# Patient Record
Sex: MALE | Race: WHITE | NOT HISPANIC OR LATINO | Employment: OTHER | URBAN - METROPOLITAN AREA
[De-identification: names, ages, dates, MRNs, and addresses within clinical notes are randomized per-mention and may not be internally consistent; named-entity substitution may affect disease eponyms.]

---

## 2018-07-06 ENCOUNTER — ANESTHESIA (OUTPATIENT)
Dept: PERIOP | Facility: HOSPITAL | Age: 63
End: 2018-07-06

## 2018-07-06 ENCOUNTER — HOSPITAL ENCOUNTER (OUTPATIENT)
Facility: HOSPITAL | Age: 63
Setting detail: HOSPITAL OUTPATIENT SURGERY
Discharge: HOME | End: 2018-07-06
Attending: UROLOGY | Admitting: UROLOGY

## 2018-07-06 ENCOUNTER — ANESTHESIA EVENT (OUTPATIENT)
Dept: PERIOP | Facility: HOSPITAL | Age: 63
End: 2018-07-06

## 2018-07-06 VITALS
RESPIRATION RATE: 16 BRPM | HEIGHT: 69 IN | SYSTOLIC BLOOD PRESSURE: 137 MMHG | DIASTOLIC BLOOD PRESSURE: 85 MMHG | TEMPERATURE: 98.6 F | HEART RATE: 55 BPM | OXYGEN SATURATION: 93 % | WEIGHT: 187.17 LBS | BODY MASS INDEX: 27.72 KG/M2

## 2018-07-06 DIAGNOSIS — N20.1 URETERAL CALCULUS, LEFT: Primary | ICD-10-CM

## 2018-07-06 PROCEDURE — 25010000002 DEXAMETHASONE PER 1 MG: Performed by: NURSE ANESTHETIST, CERTIFIED REGISTERED

## 2018-07-06 PROCEDURE — 25010000002 ONDANSETRON PER 1 MG: Performed by: NURSE ANESTHETIST, CERTIFIED REGISTERED

## 2018-07-06 PROCEDURE — 25010000002 FENTANYL CITRATE (PF) 100 MCG/2ML SOLUTION: Performed by: NURSE ANESTHETIST, CERTIFIED REGISTERED

## 2018-07-06 PROCEDURE — 25010000002 KETOROLAC TROMETHAMINE PER 15 MG: Performed by: NURSE ANESTHETIST, CERTIFIED REGISTERED

## 2018-07-06 PROCEDURE — C1758 CATHETER, URETERAL: HCPCS | Performed by: UROLOGY

## 2018-07-06 PROCEDURE — C1769 GUIDE WIRE: HCPCS | Performed by: UROLOGY

## 2018-07-06 PROCEDURE — 25010000002 PROPOFOL 10 MG/ML EMULSION: Performed by: NURSE ANESTHETIST, CERTIFIED REGISTERED

## 2018-07-06 PROCEDURE — 25010000002 MIDAZOLAM PER 1 MG: Performed by: ANESTHESIOLOGY

## 2018-07-06 RX ORDER — PROMETHAZINE HYDROCHLORIDE 25 MG/1
25 SUPPOSITORY RECTAL ONCE AS NEEDED
Status: DISCONTINUED | OUTPATIENT
Start: 2018-07-06 | End: 2018-07-06 | Stop reason: HOSPADM

## 2018-07-06 RX ORDER — PROMETHAZINE HYDROCHLORIDE 25 MG/ML
6.25 INJECTION, SOLUTION INTRAMUSCULAR; INTRAVENOUS ONCE AS NEEDED
Status: DISCONTINUED | OUTPATIENT
Start: 2018-07-06 | End: 2018-07-06 | Stop reason: HOSPADM

## 2018-07-06 RX ORDER — FENTANYL CITRATE 50 UG/ML
INJECTION, SOLUTION INTRAMUSCULAR; INTRAVENOUS AS NEEDED
Status: DISCONTINUED | OUTPATIENT
Start: 2018-07-06 | End: 2018-07-06 | Stop reason: SURG

## 2018-07-06 RX ORDER — FLUMAZENIL 0.1 MG/ML
0.2 INJECTION INTRAVENOUS AS NEEDED
Status: DISCONTINUED | OUTPATIENT
Start: 2018-07-06 | End: 2018-07-06 | Stop reason: HOSPADM

## 2018-07-06 RX ORDER — CEFAZOLIN SODIUM 2 G/100ML
2 INJECTION, SOLUTION INTRAVENOUS ONCE
Status: DISCONTINUED | OUTPATIENT
Start: 2018-07-06 | End: 2018-07-06 | Stop reason: HOSPADM

## 2018-07-06 RX ORDER — DIPHENHYDRAMINE HYDROCHLORIDE 50 MG/ML
6.25 INJECTION INTRAMUSCULAR; INTRAVENOUS
Status: DISCONTINUED | OUTPATIENT
Start: 2018-07-06 | End: 2018-07-06 | Stop reason: HOSPADM

## 2018-07-06 RX ORDER — PROPOFOL 10 MG/ML
VIAL (ML) INTRAVENOUS AS NEEDED
Status: DISCONTINUED | OUTPATIENT
Start: 2018-07-06 | End: 2018-07-06 | Stop reason: SURG

## 2018-07-06 RX ORDER — PROMETHAZINE HYDROCHLORIDE 25 MG/1
25 TABLET ORAL ONCE AS NEEDED
Status: DISCONTINUED | OUTPATIENT
Start: 2018-07-06 | End: 2018-07-06 | Stop reason: HOSPADM

## 2018-07-06 RX ORDER — SODIUM CHLORIDE 0.9 % (FLUSH) 0.9 %
1-10 SYRINGE (ML) INJECTION AS NEEDED
Status: DISCONTINUED | OUTPATIENT
Start: 2018-07-06 | End: 2018-07-06 | Stop reason: HOSPADM

## 2018-07-06 RX ORDER — DEXAMETHASONE SODIUM PHOSPHATE 10 MG/ML
INJECTION INTRAMUSCULAR; INTRAVENOUS AS NEEDED
Status: DISCONTINUED | OUTPATIENT
Start: 2018-07-06 | End: 2018-07-06 | Stop reason: SURG

## 2018-07-06 RX ORDER — OXYCODONE HYDROCHLORIDE AND ACETAMINOPHEN 5; 325 MG/1; MG/1
1 TABLET ORAL EVERY 6 HOURS PRN
COMMUNITY

## 2018-07-06 RX ORDER — PROMETHAZINE HYDROCHLORIDE 25 MG/1
12.5 TABLET ORAL ONCE AS NEEDED
Status: DISCONTINUED | OUTPATIENT
Start: 2018-07-06 | End: 2018-07-06 | Stop reason: HOSPADM

## 2018-07-06 RX ORDER — SODIUM CHLORIDE, SODIUM LACTATE, POTASSIUM CHLORIDE, CALCIUM CHLORIDE 600; 310; 30; 20 MG/100ML; MG/100ML; MG/100ML; MG/100ML
9 INJECTION, SOLUTION INTRAVENOUS CONTINUOUS
Status: DISCONTINUED | OUTPATIENT
Start: 2018-07-06 | End: 2018-07-06 | Stop reason: HOSPADM

## 2018-07-06 RX ORDER — EPHEDRINE SULFATE 50 MG/ML
5 INJECTION, SOLUTION INTRAVENOUS ONCE AS NEEDED
Status: DISCONTINUED | OUTPATIENT
Start: 2018-07-06 | End: 2018-07-06 | Stop reason: HOSPADM

## 2018-07-06 RX ORDER — CEFAZOLIN SODIUM 1 G/50ML
1 INJECTION, SOLUTION INTRAVENOUS ONCE
Status: DISCONTINUED | OUTPATIENT
Start: 2018-07-06 | End: 2018-07-06 | Stop reason: HOSPADM

## 2018-07-06 RX ORDER — OXYCODONE AND ACETAMINOPHEN 7.5; 325 MG/1; MG/1
1 TABLET ORAL EVERY 6 HOURS PRN
Qty: 20 TABLET | Refills: 0 | Status: SHIPPED | OUTPATIENT
Start: 2018-07-06

## 2018-07-06 RX ORDER — TAMSULOSIN HYDROCHLORIDE 0.4 MG/1
1 CAPSULE ORAL DAILY
Qty: 10 CAPSULE | Refills: 0 | Status: SHIPPED | OUTPATIENT
Start: 2018-07-06 | End: 2018-07-20

## 2018-07-06 RX ORDER — TAMSULOSIN HYDROCHLORIDE 0.4 MG/1
1 CAPSULE ORAL DAILY
COMMUNITY
End: 2022-09-07

## 2018-07-06 RX ORDER — ONDANSETRON 2 MG/ML
INJECTION INTRAMUSCULAR; INTRAVENOUS AS NEEDED
Status: DISCONTINUED | OUTPATIENT
Start: 2018-07-06 | End: 2018-07-06 | Stop reason: SURG

## 2018-07-06 RX ORDER — NALOXONE HCL 0.4 MG/ML
0.4 VIAL (ML) INJECTION AS NEEDED
Status: DISCONTINUED | OUTPATIENT
Start: 2018-07-06 | End: 2018-07-06 | Stop reason: HOSPADM

## 2018-07-06 RX ORDER — OXYCODONE HYDROCHLORIDE AND ACETAMINOPHEN 5; 325 MG/1; MG/1
1 TABLET ORAL ONCE AS NEEDED
Status: DISCONTINUED | OUTPATIENT
Start: 2018-07-06 | End: 2018-07-06 | Stop reason: HOSPADM

## 2018-07-06 RX ORDER — PROMETHAZINE HYDROCHLORIDE 25 MG/ML
12.5 INJECTION, SOLUTION INTRAMUSCULAR; INTRAVENOUS ONCE AS NEEDED
Status: DISCONTINUED | OUTPATIENT
Start: 2018-07-06 | End: 2018-07-06 | Stop reason: HOSPADM

## 2018-07-06 RX ORDER — LIDOCAINE HYDROCHLORIDE 20 MG/ML
INJECTION, SOLUTION INFILTRATION; PERINEURAL AS NEEDED
Status: DISCONTINUED | OUTPATIENT
Start: 2018-07-06 | End: 2018-07-06 | Stop reason: SURG

## 2018-07-06 RX ORDER — FENTANYL CITRATE 50 UG/ML
25 INJECTION, SOLUTION INTRAMUSCULAR; INTRAVENOUS
Status: DISCONTINUED | OUTPATIENT
Start: 2018-07-06 | End: 2018-07-06 | Stop reason: HOSPADM

## 2018-07-06 RX ORDER — MIDAZOLAM HYDROCHLORIDE 1 MG/ML
2 INJECTION INTRAMUSCULAR; INTRAVENOUS
Status: DISCONTINUED | OUTPATIENT
Start: 2018-07-06 | End: 2018-07-06 | Stop reason: HOSPADM

## 2018-07-06 RX ORDER — SULFAMETHOXAZOLE AND TRIMETHOPRIM 800; 160 MG/1; MG/1
1 TABLET ORAL 2 TIMES DAILY
Qty: 6 TABLET | Refills: 0 | Status: SHIPPED | OUTPATIENT
Start: 2018-07-06

## 2018-07-06 RX ORDER — FAMOTIDINE 10 MG/ML
20 INJECTION, SOLUTION INTRAVENOUS ONCE
Status: COMPLETED | OUTPATIENT
Start: 2018-07-06 | End: 2018-07-06

## 2018-07-06 RX ORDER — MIDAZOLAM HYDROCHLORIDE 1 MG/ML
1 INJECTION INTRAMUSCULAR; INTRAVENOUS
Status: DISCONTINUED | OUTPATIENT
Start: 2018-07-06 | End: 2018-07-06 | Stop reason: HOSPADM

## 2018-07-06 RX ORDER — ONDANSETRON 2 MG/ML
4 INJECTION INTRAMUSCULAR; INTRAVENOUS ONCE AS NEEDED
Status: DISCONTINUED | OUTPATIENT
Start: 2018-07-06 | End: 2018-07-06 | Stop reason: HOSPADM

## 2018-07-06 RX ORDER — MAGNESIUM HYDROXIDE 1200 MG/15ML
LIQUID ORAL AS NEEDED
Status: DISCONTINUED | OUTPATIENT
Start: 2018-07-06 | End: 2018-07-06 | Stop reason: HOSPADM

## 2018-07-06 RX ORDER — LABETALOL HYDROCHLORIDE 5 MG/ML
5 INJECTION, SOLUTION INTRAVENOUS
Status: DISCONTINUED | OUTPATIENT
Start: 2018-07-06 | End: 2018-07-06 | Stop reason: HOSPADM

## 2018-07-06 RX ORDER — KETOROLAC TROMETHAMINE 30 MG/ML
INJECTION, SOLUTION INTRAMUSCULAR; INTRAVENOUS AS NEEDED
Status: DISCONTINUED | OUTPATIENT
Start: 2018-07-06 | End: 2018-07-06 | Stop reason: SURG

## 2018-07-06 RX ADMIN — FENTANYL CITRATE 50 MCG: 50 INJECTION, SOLUTION INTRAMUSCULAR; INTRAVENOUS at 12:48

## 2018-07-06 RX ADMIN — MIDAZOLAM HYDROCHLORIDE 1 MG: 2 INJECTION, SOLUTION INTRAMUSCULAR; INTRAVENOUS at 12:17

## 2018-07-06 RX ADMIN — FENTANYL CITRATE 50 MCG: 50 INJECTION, SOLUTION INTRAMUSCULAR; INTRAVENOUS at 13:03

## 2018-07-06 RX ADMIN — FENTANYL CITRATE 50 MCG: 50 INJECTION, SOLUTION INTRAMUSCULAR; INTRAVENOUS at 12:59

## 2018-07-06 RX ADMIN — LIDOCAINE HYDROCHLORIDE 50 MG: 20 INJECTION, SOLUTION INFILTRATION; PERINEURAL at 12:43

## 2018-07-06 RX ADMIN — SODIUM CHLORIDE, POTASSIUM CHLORIDE, SODIUM LACTATE AND CALCIUM CHLORIDE 9 ML/HR: 600; 310; 30; 20 INJECTION, SOLUTION INTRAVENOUS at 11:16

## 2018-07-06 RX ADMIN — DEXAMETHASONE SODIUM PHOSPHATE 8 MG: 10 INJECTION INTRAMUSCULAR; INTRAVENOUS at 12:48

## 2018-07-06 RX ADMIN — FAMOTIDINE 20 MG: 10 INJECTION, SOLUTION INTRAVENOUS at 12:17

## 2018-07-06 RX ADMIN — OXYCODONE HYDROCHLORIDE AND ACETAMINOPHEN 1 TABLET: 5; 325 TABLET ORAL at 14:37

## 2018-07-06 RX ADMIN — FENTANYL CITRATE 50 MCG: 50 INJECTION, SOLUTION INTRAMUSCULAR; INTRAVENOUS at 12:54

## 2018-07-06 RX ADMIN — FENTANYL CITRATE 50 MCG: 50 INJECTION, SOLUTION INTRAMUSCULAR; INTRAVENOUS at 12:43

## 2018-07-06 RX ADMIN — KETOROLAC TROMETHAMINE 30 MG: 30 INJECTION, SOLUTION INTRAMUSCULAR; INTRAVENOUS at 12:48

## 2018-07-06 RX ADMIN — PROPOFOL 200 MG: 10 INJECTION, EMULSION INTRAVENOUS at 12:43

## 2018-07-06 RX ADMIN — ONDANSETRON 4 MG: 2 INJECTION INTRAMUSCULAR; INTRAVENOUS at 12:48

## 2022-09-07 RX ORDER — TAMSULOSIN HYDROCHLORIDE 0.4 MG/1
CAPSULE ORAL
Qty: 90 CAPSULE | Refills: 0 | Status: SHIPPED | OUTPATIENT
Start: 2022-09-07

## 2022-12-10 RX ORDER — TAMSULOSIN HYDROCHLORIDE 0.4 MG/1
CAPSULE ORAL
Qty: 90 CAPSULE | Refills: 0 | OUTPATIENT
Start: 2022-12-10

## 2024-09-20 ENCOUNTER — OFFICE VISIT (OUTPATIENT)
Dept: FAMILY MEDICINE CLINIC | Facility: CLINIC | Age: 69
End: 2024-09-20
Payer: MEDICARE

## 2024-09-20 VITALS
OXYGEN SATURATION: 96 % | WEIGHT: 196 LBS | BODY MASS INDEX: 29.03 KG/M2 | HEIGHT: 69 IN | SYSTOLIC BLOOD PRESSURE: 148 MMHG | TEMPERATURE: 97.9 F | HEART RATE: 73 BPM | DIASTOLIC BLOOD PRESSURE: 82 MMHG

## 2024-09-20 DIAGNOSIS — L72.3 SEBACEOUS CYST: Primary | ICD-10-CM

## 2024-09-20 PROCEDURE — 1160F RVW MEDS BY RX/DR IN RCRD: CPT | Performed by: INTERNAL MEDICINE

## 2024-09-20 PROCEDURE — 99203 OFFICE O/P NEW LOW 30 MIN: CPT | Performed by: INTERNAL MEDICINE

## 2024-09-20 PROCEDURE — 1159F MED LIST DOCD IN RCRD: CPT | Performed by: INTERNAL MEDICINE

## 2024-09-20 RX ORDER — SULFAMETHOXAZOLE/TRIMETHOPRIM 800-160 MG
1 TABLET ORAL 2 TIMES DAILY
Qty: 14 TABLET | Refills: 0 | Status: SHIPPED | OUTPATIENT
Start: 2024-09-20

## 2024-09-20 RX ORDER — MUPIROCIN 20 MG/G
1 OINTMENT TOPICAL 3 TIMES DAILY
Qty: 22 G | Refills: 1 | Status: SHIPPED | OUTPATIENT
Start: 2024-09-20

## 2025-01-21 ENCOUNTER — OFFICE VISIT (OUTPATIENT)
Dept: FAMILY MEDICINE CLINIC | Facility: CLINIC | Age: 70
End: 2025-01-21
Payer: MEDICARE

## 2025-01-21 VITALS
HEART RATE: 62 BPM | OXYGEN SATURATION: 99 % | WEIGHT: 193.5 LBS | DIASTOLIC BLOOD PRESSURE: 78 MMHG | BODY MASS INDEX: 28.66 KG/M2 | SYSTOLIC BLOOD PRESSURE: 142 MMHG | HEIGHT: 69 IN

## 2025-01-21 DIAGNOSIS — R20.2 PARESTHESIA OF BOTH LOWER EXTREMITIES: ICD-10-CM

## 2025-01-21 DIAGNOSIS — Z11.59 NEED FOR HEPATITIS C SCREENING TEST: ICD-10-CM

## 2025-01-21 DIAGNOSIS — Z13.220 SCREENING FOR HYPERLIPIDEMIA: ICD-10-CM

## 2025-01-21 DIAGNOSIS — Z00.00 ENCOUNTER FOR SUBSEQUENT ANNUAL WELLNESS VISIT (AWV) IN MEDICARE PATIENT: Primary | ICD-10-CM

## 2025-01-21 DIAGNOSIS — Z12.5 SCREENING FOR PROSTATE CANCER: ICD-10-CM

## 2025-01-21 DIAGNOSIS — Z13.1 SCREENING FOR DIABETES MELLITUS: ICD-10-CM

## 2025-01-21 PROCEDURE — 1160F RVW MEDS BY RX/DR IN RCRD: CPT | Performed by: INTERNAL MEDICINE

## 2025-01-21 PROCEDURE — 1126F AMNT PAIN NOTED NONE PRSNT: CPT | Performed by: INTERNAL MEDICINE

## 2025-01-21 PROCEDURE — G0439 PPPS, SUBSEQ VISIT: HCPCS | Performed by: INTERNAL MEDICINE

## 2025-01-21 PROCEDURE — 1159F MED LIST DOCD IN RCRD: CPT | Performed by: INTERNAL MEDICINE

## 2025-01-21 NOTE — PROGRESS NOTES
Subjective   The ABCs of the Annual Wellness Visit  Medicare Wellness Visit      Arturo Graham is a 69 y.o. patient who presents for a Medicare Wellness Visit.    The following portions of the patient's history were reviewed and   updated as appropriate: allergies, current medications, past family history, past medical history, past social history, past surgical history, and problem list.    Compared to one year ago, the patient's physical   health is worse.  Compared to one year ago, the patient's mental   health is the same.    Recent Hospitalizations:  He was not admitted to the hospital during the last year.     Current Medical Providers:  Patient Care Team:  Olivia Alston MD as PCP - General (Internal Medicine & Pediatrics)    Outpatient Medications Prior to Visit   Medication Sig Dispense Refill    mupirocin (BACTROBAN) 2 % ointment Apply 1 Application topically to the appropriate area as directed 3 (Three) Times a Day. 22 g 1    sulfamethoxazole-trimethoprim (Bactrim DS) 800-160 MG per tablet Take 1 tablet by mouth 2 (Two) Times a Day. 14 tablet 0     No facility-administered medications prior to visit.     No opioid medication identified on active medication list. I have reviewed chart for other potential  high risk medication/s and harmful drug interactions in the elderly.      Aspirin is not on active medication list.  Aspirin use is indicated based on review of current medical condition/s. Pros and cons of this therapy have been discussed with this patient. Benefits of this medication outweigh potential harm.  Patient has been instructed to start taking this medication..    Patient Active Problem List   Diagnosis    Ureteral calculus, left     Advance Care Planning Advance Directive is not on file.  ACP discussion was held with the patient during this visit. Patient does not have an advance directive, information provided.            Objective   Vitals:    01/21/25 1002   BP: 142/78   Pulse: 62   SpO2:  "99%   Weight: 87.8 kg (193 lb 8 oz)   Height: 175.3 cm (69\")   PainSc: 0-No pain       Estimated body mass index is 28.57 kg/m² as calculated from the following:    Height as of this encounter: 175.3 cm (69\").    Weight as of this encounter: 87.8 kg (193 lb 8 oz).                Does the patient have evidence of cognitive impairment? No                                                                                                Health  Risk Assessment    Smoking Status:  Social History     Tobacco Use   Smoking Status Never   Smokeless Tobacco Never     Alcohol Consumption:  Social History     Substance and Sexual Activity   Alcohol Use Yes    Alcohol/week: 5.0 standard drinks of alcohol    Types: 5 Glasses of wine per week    Comment: OCCASIONALLY       Fall Risk Screen  JASS Fall Risk Assessment was completed, and patient is at LOW risk for falls.Assessment completed on:2025    Depression Screening   The PHQ has not been completed during this encounter.     Health Habits and Functional and Cognitive Screenin/14/2025    12:16 PM   Functional & Cognitive Status   Do you have difficulty preparing food and eating? No    Do you have difficulty bathing yourself, getting dressed or grooming yourself? No    Do you have difficulty using the toilet? No    Do you have difficulty moving around from place to place? No    Do you have trouble with steps or getting out of a bed or a chair? No    Current Diet Low Carb Diet    Dental Exam Up to date    Eye Exam Not up to date    Exercise (times per week) 7 times per week    Current Exercises Include Walking    Do you need help using the phone?  No    Are you deaf or do you have serious difficulty hearing?  No    Do you need help to go to places out of walking distance? No    Do you need help shopping? No    Do you need help preparing meals?  No    Do you need help with housework?  No    Do you need help with laundry? No    Do you need help taking your medications? " No    Do you need help managing money? No    Do you ever drive or ride in a car without wearing a seat belt? No    Have you felt unusual stress, anger or loneliness in the last month? No    Who do you live with? Spouse    If you need help, do you have trouble finding someone available to you? No    Have you been bothered in the last four weeks by sexual problems? No    Do you have difficulty concentrating, remembering or making decisions? No        Patient-reported           Age-appropriate Screening Schedule:  Refer to the list below for future screening recommendations based on patient's age, sex and/or medical conditions. Orders for these recommended tests are listed in the plan section. The patient has been provided with a written plan.    Health Maintenance List  Health Maintenance   Topic Date Due    COLORECTAL CANCER SCREENING  Never done    TDAP/TD VACCINES (1 - Tdap) Never done    ZOSTER VACCINE (1 of 2) Never done    Pneumococcal Vaccine 65+ (2 of 2 - PPSV23 or PCV20) 07/19/2020    HEPATITIS C SCREENING  Never done    INFLUENZA VACCINE  03/31/2025 (Originally 7/1/2024)    COVID-19 Vaccine (3 - 2024-25 season) 04/12/2025 (Originally 9/1/2024)    BMI FOLLOWUP  09/20/2025    ANNUAL WELLNESS VISIT  01/21/2026                                                                                                                                                CMS Preventative Services Quick Reference  Risk Factors Identified During Encounter  None Identified    The above risks/problems have been discussed with the patient.  Pertinent information has been shared with the patient in the After Visit Summary.  An After Visit Summary and PPPS were made available to the patient.    Follow Up:   Next Medicare Wellness visit to be scheduled in 1 year.         Additional E&M Note during same encounter follows:  Patient has additional, significant, and separately identifiable condition(s)/problem(s) that require work above and  beyond the Medicare Wellness Visit     Chief Complaint  Annual Exam (Patient presents today for medicare wellness.)    Subjective    HPI         The patient is a 69-year-old male who presents today for a subsequent Medicare wellness visit.    He reports a perceived decline in his physical health, attributing it to the natural aging process. He has not experienced any hospitalizations or surgeries this year. His mental and emotional health remains stable. He resides with his wife and maintains an active lifestyle, including daily walks of approximately 3 miles. He has received a pneumonia vaccine in 2016 and declines the offer of a COVID-19 vaccine. He reports no leg swelling, constipation, or diarrhea. His current medication regimen includes baby aspirin, B complex, B12, turmeric, and Cholesterol 360.    He experiences nocturia, necessitating urination twice per night, with occasional difficulty in initiating the stream. He has observed that physical activity, such as moving around for 5 minutes, facilitates urination. He previously took Flomax but discontinued its use due to perceived ineffectiveness.    He also reports occasional tingling in his feet at the end of the day, which he attributes to his high daily step count of 15,000 to 22,000 steps. He has noticed an improvement in this symptom since changing his footwear.    He experiences bilateral hand pain, particularly after lifting heavy objects such as firewood. He does not report any associated thumb stiffness or locking.    Supplemental Information  He has a history of kidney stones, with the last episode occurring in 2018. He reports minor memory lapses, such as misplacing his keys, but does not experience disorientation or getting lost.    MEDICATIONS  Current: Baby aspirin, B complex, B12, turmeric, Cholesterol 360.  Discontinued: Flomax.    IMMUNIZATIONS  He has had a pneumonia vaccine in 2016.          Objective   Vital Signs:  /78   Pulse 62    "Ht 175.3 cm (69\")   Wt 87.8 kg (193 lb 8 oz)   SpO2 99%   BMI 28.57 kg/m²   Physical Exam      Lungs were auscultated.            Results                Assessment and Plan        1. Subsequent Medicare wellness visit.  He reports a gradual decline in physical health, attributing it to aging, but no significant changes in mental or emotional health. He experiences minor memory issues, such as forgetting where he places his keys, but no major concerns like getting lost. He received a pneumonia vaccine in 2016 and is not interested in the COVID-19 vaccine. A comprehensive blood work panel will be ordered, including cholesterol, glucose, and prostate-specific antigen (PSA) levels. He is advised to consider the RSV vaccine, pending insurance coverage, and to receive a tetanus booster every 10 years. A handout with information on advance directives will be provided.    2. Nocturia.  He reports getting up about twice a night to urinate, sometimes experiencing slow urine flow. He previously tried a medication similar to Flomax but found it ineffective. A PSA test will be included in the blood work to rule out prostate issues.    3. Bilateral hand pain.  He experiences pain in both hands, likely due to arthritis from frequent heavy lifting. He is advised to apply Voltaren gel, an over-the-counter topical treatment, to the affected areas 2 to 3 times daily.    4. Tingling in feet.  He reports occasional tingling in his feet, which has improved with a change in footwear. Differential diagnoses include diabetes and vitamin deficiency. Blood work will include tests for these conditions.            Follow Up   No follow-ups on file.  Patient was given instructions and counseling regarding his condition or for health maintenance advice. Please see specific information pulled into the AVS if appropriate.  Patient or patient representative verbalized consent for the use of Ambient Listening during the visit with  Olivia Caro " MD Gamaliel for chart documentation. 1/21/2025  10:33 EST

## 2025-01-21 NOTE — LETTER
January 21, 2025    Arturo Graham  [address] [facility] [place] [address]          Unless you have had a previous reaction, I recommend and encourage that you request to receive the following vaccines at your pharmacy:    RSV- after AGE 70 (Arexvy or Abrysvo- respiratory syncytial virus vaccine)  Shingrix (Shingles vaccine)   Tdap (Tetanus  and whooping cough booster)  COVID-19 vaccines (you may need the newest booster)   PCV20 recommend one dose after age 65 (Pneumonia Vaccine)  Influenza (Flu  Vaccine) starting in September or October.    They can administer this at your convenience without a prescription.    If you have already received these from your pharmacy or another physician, or if you get them in the future please be sure to provide us a copy of your immunization record so that we can update your records.    You can have your pharmacy fax them to the number above or find the exact date of your vaccines and bring them to your next appointment.                       Olivia Alston MD

## 2025-01-22 DIAGNOSIS — R97.20 ELEVATED PSA: Primary | ICD-10-CM

## 2025-01-22 LAB
ALBUMIN SERPL-MCNC: 4.6 G/DL (ref 3.9–4.9)
ALP SERPL-CCNC: 73 IU/L (ref 44–121)
ALT SERPL-CCNC: 20 IU/L (ref 0–44)
AST SERPL-CCNC: 22 IU/L (ref 0–40)
BASOPHILS # BLD AUTO: 0 X10E3/UL (ref 0–0.2)
BASOPHILS NFR BLD AUTO: 1 %
BILIRUB SERPL-MCNC: 0.3 MG/DL (ref 0–1.2)
BUN SERPL-MCNC: 15 MG/DL (ref 8–27)
BUN/CREAT SERPL: 16 (ref 10–24)
CALCIUM SERPL-MCNC: 10.6 MG/DL (ref 8.6–10.2)
CHLORIDE SERPL-SCNC: 106 MMOL/L (ref 96–106)
CHOLEST SERPL-MCNC: 244 MG/DL (ref 100–199)
CO2 SERPL-SCNC: 25 MMOL/L (ref 20–29)
CREAT SERPL-MCNC: 0.94 MG/DL (ref 0.76–1.27)
EGFRCR SERPLBLD CKD-EPI 2021: 88 ML/MIN/1.73
EOSINOPHIL # BLD AUTO: 0.1 X10E3/UL (ref 0–0.4)
EOSINOPHIL NFR BLD AUTO: 1 %
ERYTHROCYTE [DISTWIDTH] IN BLOOD BY AUTOMATED COUNT: 12.9 % (ref 11.6–15.4)
FOLATE SERPL-MCNC: 19 NG/ML
GLOBULIN SER CALC-MCNC: 2.5 G/DL (ref 1.5–4.5)
GLUCOSE SERPL-MCNC: 111 MG/DL (ref 70–99)
HCT VFR BLD AUTO: 49.5 % (ref 37.5–51)
HCV IGG SERPL QL IA: NON REACTIVE
HDLC SERPL-MCNC: 35 MG/DL
HGB BLD-MCNC: 16.7 G/DL (ref 13–17.7)
IMM GRANULOCYTES # BLD AUTO: 0 X10E3/UL (ref 0–0.1)
IMM GRANULOCYTES NFR BLD AUTO: 0 %
LDL CALC COMMENT:: ABNORMAL
LDLC SERPL CALC-MCNC: 192 MG/DL (ref 0–99)
LYMPHOCYTES # BLD AUTO: 1.3 X10E3/UL (ref 0.7–3.1)
LYMPHOCYTES NFR BLD AUTO: 21 %
MCH RBC QN AUTO: 31.3 PG (ref 26.6–33)
MCHC RBC AUTO-ENTMCNC: 33.7 G/DL (ref 31.5–35.7)
MCV RBC AUTO: 93 FL (ref 79–97)
MONOCYTES # BLD AUTO: 0.5 X10E3/UL (ref 0.1–0.9)
MONOCYTES NFR BLD AUTO: 7 %
NEUTROPHILS # BLD AUTO: 4.3 X10E3/UL (ref 1.4–7)
NEUTROPHILS NFR BLD AUTO: 70 %
PLATELET # BLD AUTO: 194 X10E3/UL (ref 150–450)
POTASSIUM SERPL-SCNC: 4.8 MMOL/L (ref 3.5–5.2)
PROT SERPL-MCNC: 7.1 G/DL (ref 6–8.5)
PSA SERPL-MCNC: 11.1 NG/ML (ref 0–4)
RBC # BLD AUTO: 5.33 X10E6/UL (ref 4.14–5.8)
SODIUM SERPL-SCNC: 144 MMOL/L (ref 134–144)
TRIGL SERPL-MCNC: 97 MG/DL (ref 0–149)
TSH SERPL DL<=0.005 MIU/L-ACNC: 1.9 UIU/ML (ref 0.45–4.5)
VIT B12 SERPL-MCNC: 1024 PG/ML (ref 232–1245)
VLDLC SERPL CALC-MCNC: 17 MG/DL (ref 5–40)
WBC # BLD AUTO: 6.2 X10E3/UL (ref 3.4–10.8)

## 2025-01-29 ENCOUNTER — TELEPHONE (OUTPATIENT)
Dept: FAMILY MEDICINE CLINIC | Facility: CLINIC | Age: 70
End: 2025-01-29
Payer: MEDICARE

## 2025-01-29 NOTE — TELEPHONE ENCOUNTER
Caller: Arturo Graham    Relationship: Self    Best call back number: [phone]     What is the best time to reach you: ANYTIME    Who are you requesting to speak with (clinical staff, provider,  specific staff member): DR. ELLIOTT MCCORMICK    Do you know the name of the person who called: ARTURO    What was the call regarding: PATIENT IS CALLING TO CHECK THE STATU OF THE CHOLESTEROL MEDICATION THAT WAS SUPPOSED TO HAVE BEEN PRESCRIBED ON 01/21/25 .    PLEASE CALL PATIENT TO FOLLOW UP ON THIS     Is it okay if the provider responds through MyChart: NO PLEASE CALL

## 2025-02-05 ENCOUNTER — OFFICE VISIT (OUTPATIENT)
Dept: UROLOGY | Facility: CLINIC | Age: 70
End: 2025-02-05
Payer: MEDICARE

## 2025-02-05 VITALS — BODY MASS INDEX: 28.58 KG/M2 | HEIGHT: 69 IN | WEIGHT: 193 LBS

## 2025-02-05 DIAGNOSIS — R97.20 ELEVATED PROSTATE SPECIFIC ANTIGEN (PSA): Primary | ICD-10-CM

## 2025-02-05 NOTE — PROGRESS NOTES
Office Visit [facility]      Patient Name: Arturo Graham  : 1955   MRN: [id number]     Chief Complaint:    Chief Complaint   Patient presents with    Elevated PSA     11.1 on 2025       Referring Provider: Olivia Alston MD    History of Present Illness: Arturo Graham is a 69 y.o. male who presents to Urology today for elevated PSA and nephrolithiasis.     Lab Results   Component Value Date    PSA 11.1 (H) 2025     He states he saw Dr. Campbell a prior urologist with [facility] who never performed a prostate biopsy, but had told the patient he had elevated PSA in the past.     He has not had prior MRI prostate. No records from Dr Campbell are available.     He has mild to moderate LUTS and has trialed Tamsulosin with minimal improvement.     He reports intermittent weak stream and nocturia 1-2x.     Subjective      Review of System: Review of Systems   Genitourinary:  Negative for decreased urine volume, difficulty urinating, dysuria, enuresis, flank pain, frequency, hematuria and urgency.      I have reviewed the ROS documented by my clinical staff, I have updated appropriately and I agree. Gael Aleman MD    Past Medical History:   Past Medical History:   Diagnosis Date    Elevated PSA     HL (hearing loss)     Sometimes have to have something repeated if there is background noise    Kidney stone     Had a kidney stone removed in 2018       Past Surgical History:   Past Surgical History:   Procedure Laterality Date    2020    EXTRACORPOREAL SHOCK WAVE LITHOTRIPSY (ESWL) Left 2018    Procedure: LT EXTRACORPOREAL SHOCKWAVE LITHOTRIPSY WITH CYSTOSCOPY MANIPULATION URETERAL STONE;  Surgeon: Jaime Ken MD;  [place] [facility] [place] [facility] [place]  Service: Urology    KIDNEY STONE SURGERY         Family History:   Family History   Problem Relation Age of Onset    Skin cancer Mother     Diabetes Mother     Heart attack Father     Heart disease Father      Hyperlipidemia Father        Social History:   Social History     Socioeconomic History    Marital status:    Tobacco Use    Smoking status: Never    Smokeless tobacco: Never   Vaping Use    Vaping status: Never Used   Substance and Sexual Activity    Alcohol use: Yes     Alcohol/week: 5.0 standard drinks of alcohol     Types: 5 Glasses of wine per week     Comment: OCCASIONALLY    Drug use: No    Sexual activity: Not Currently     Partners: Female       Medications:   No current outpatient medications on file.    Allergies:   No Known Allergies    IPSS Questionnaire (AUA-7):  Over the past month…    1)  Incomplete Emptying:       How often have you had a sensation of not emptying you had the sensation of not emptying your bladder completely after you finished urinating?  2 - Less than half the time   2)  Frequency:       How often have you had the urinate again less than two hours after you finished urinating?  2 - Less than half the time   3)  Intermittency:       How often have you found you stopped and started again several times when you urinated?   1 - Less than 1 time in 5   4) Urgency:      How often have you found it difficult to postpone urination?  2 - Less than half the time   5) Weak Stream:      How often have you had a weak urinary stream?  2 - Less than half the time   6) Straining:       How often have you had to push or strain to begin urination?  1 - Less than 1 time in 5   7) Nocturia:      How many times did you most typically get up to urinate from the time you went to bed at night until the time you got up in the morning?  2 - 2 times   Total Score:  12   The International Prostate Symptom Score (IPSS) is used to screen, diagnose, track symptoms of benign prostatic hyperplasia (BPH).   0-7 (Mild Symptoms) 8-19 (Moderate) 20-35 (Severe)   Quality of Life (QoL):  If you were to spend the rest of your life with your urinary condition just the way it is now, how would you feel about that?  "2-Mostly Satisfied   Urine Leakage (Incontinence) 0-No Leakage     Objective     Physical Exam:   Vital Signs:   Vitals:    02/05/25 1310   Weight: 87.5 kg (193 lb)   Height: 175.3 cm (69\")   PainSc: 0-No pain     Body mass index is 28.5 kg/m².     Physical Exam  Vitals and nursing note reviewed.   Constitutional:       Appearance: Normal appearance.   HENT:      Head: Normocephalic and atraumatic.      Mouth/Throat:      Mouth: Mucous membranes are moist.      Pharynx: Oropharynx is clear.   Eyes:      Extraocular Movements: Extraocular movements intact.      Conjunctiva/sclera: Conjunctivae normal.   Cardiovascular:      Rate and Rhythm: Normal rate and regular rhythm.   Pulmonary:      Effort: Pulmonary effort is normal. No respiratory distress.   Abdominal:      Palpations: Abdomen is soft.      Tenderness: There is no abdominal tenderness. There is no right CVA tenderness or left CVA tenderness.   Genitourinary:     Comments:  Circumcised phallus, orthotopic meatus, bilaterally descended testicles without masses, or lesions.     PHOENIX: Normal rectal tone, no blood, estimated 50 gram prostate without nodularity or firmness.   Musculoskeletal:         General: Normal range of motion.      Cervical back: Normal range of motion.   Skin:     General: Skin is warm and dry.   Neurological:      General: No focal deficit present.      Mental Status: He is alert and oriented to person, place, and time.   Psychiatric:         Mood and Affect: Mood normal.         Behavior: Behavior normal.         Labs:   Brief Urine Lab Results       None                 Lab Results   Component Value Date    GLUCOSE 111 (H) 01/21/2025    CALCIUM 10.6 (H) 01/21/2025     01/21/2025    K 4.8 01/21/2025    CO2 25 01/21/2025     01/21/2025    BUN 15 01/21/2025    CREATININE 0.94 01/21/2025    BCR 16 01/21/2025       Lab Results   Component Value Date    WBC 6.2 01/21/2025    HGB 16.7 01/21/2025    HCT 49.5 01/21/2025    MCV 93 " "01/21/2025     01/21/2025       Images:   No Images in the past 120 days found..    Measures:   Tobacco:   Arturo Graham  reports that he has never smoked. He has never used smokeless tobacco.       Assessment / Plan      Assessment/Plan:   Arturo Graham is a 69 y.o. male who presented today for elevated PSA.     I had a detailed discussion with the patient today regarding prostate-specific antigen (PSA) and prostate cancer screening.  We discussed that PSA is an imperfect screening test and that it can be elevated for a variety of reasons including infection, inflammation, as a function of increased prostate volume, and due to malignancy.  We discussed how prostate cancer is the most commonly diagnosed malignancy among men and becomes more prevalent at advanced age.  We discussed how patients with a family history of prostate cancer are at an increased risk of developing prostate cancer over the general population.      I counseled the patient that the American Urological Association guidelines recommend PSA screening in men aged 55 through 70, or in some instances at an earlier age if positive family history for prostate cancer.  I discussed how screening men older than 70 years old is typically not recommended, unless a patient has a greater than 10-year life expectancy, is in good health, and is personally motivated to rule out prostate cancer. This is due to the fact that most men older than 70 and in poor health, (and in those men with less than 10-year life expectancy) will likely die of causes unrelated to prostate cancer.      We talked about how prostate cancer is typically a slow-growing malignancy, but identifying intermediate or high risk prostate cancers that need prompt treatment is the ultimate goal of PSA and prostate cancer screening.  I discussed with this patient that there is no \"normal\" PSA range despite the fact that most labs indicate a \"normal\" PSA range from 0 to 4 ng/dL.  There " are suggested age-adjusted PSA ranges that are also taken into account in the setting of slightly elevated PSA in the older male.  Besides age-adjusted ranges, calculating a patient's prostate volume to determine PSA density (<0.15 has a lower risk of harboring malignancy), calculating the patient's PSA velocity or doubling time, and evaluating free PSA versus total PSA values can help guide our decision making.      I also discussed the possibility of performing adjunctive tests for better risk characterization, my preference is to perform an Exo Dx urine screening score which can provide a percentage risk of harboring intermediate or high risk prostate cancers that may need treatment, based on expressed prostate cell genetics which can be analyzed on urine sample. This is beneficial in assisting with decision-making in patients with equivocal workup.      I also discussed my goal is to work-up the appropriate patient for elevated PSA as prostate biopsy and work-up for prostate cancer has inherent risks and potential harms.  The risk of prostate biopsy related sepsis is 2-4%.     In short, I discussed that PSA screening and work-up for prostate cancer should only ever occur after shared decision making conversation between the physician and patient.  Patient reports understanding.      Discussion Summary  ExoDX urine screening  Proceed with MRI prostate  Will obtain prior urology records to determine PSA trend  F/u 3 wks to review all data and will discuss possible need for prostate biopsy at that time he reports understanding    Diagnoses and all orders for this visit:    1. Elevated prostate specific antigen (PSA) (Primary)  -     MRI Prostate With & Without Contrast; Future    Other orders  -     LABS SCANNED        Follow Up:   Return in about 3 weeks (around 2/26/2025) for 3 wk to review MRI prostate, Follow Up after Imaging.    I spent approximately 30 minutes providing clinical care for this patient;  including review of patient's chart and provider documentation, face to face time spent with patient in examination room (obtaining history, performing physical exam, discussing diagnosis and management options), placing orders, and completing patient documentation.     Gael Aleman MD  [facility] [place]

## 2025-02-06 ENCOUNTER — TELEPHONE (OUTPATIENT)
Dept: UROLOGY | Facility: CLINIC | Age: 70
End: 2025-02-06
Payer: MEDICARE

## 2025-02-06 NOTE — TELEPHONE ENCOUNTER
----- Message from Gael Aleman sent at 2/5/2025  1:28 PM EST -----  Regarding: outside records  Previous urologist in [facility] a group out of [place]    Dr Campbell , [facility] records, notes and PSA thank you    Gael Aleman MD

## 2025-02-11 RX ORDER — ATORVASTATIN CALCIUM 20 MG/1
20 TABLET, FILM COATED ORAL DAILY
Qty: 90 TABLET | Refills: 1 | Status: SHIPPED | OUTPATIENT
Start: 2025-02-11

## 2025-02-21 ENCOUNTER — HOSPITAL ENCOUNTER (OUTPATIENT)
Dept: MRI IMAGING | Facility: HOSPITAL | Age: 70
Discharge: HOME | End: 2025-02-21
Payer: MEDICARE

## 2025-02-21 DIAGNOSIS — R97.20 ELEVATED PROSTATE SPECIFIC ANTIGEN (PSA): ICD-10-CM

## 2025-02-21 PROCEDURE — 72197 MRI PELVIS W/O & W/DYE: CPT

## 2025-02-21 PROCEDURE — A9577 INJ MULTIHANCE: HCPCS | Performed by: STUDENT IN AN ORGANIZED HEALTH CARE EDUCATION/TRAINING PROGRAM

## 2025-02-21 PROCEDURE — 25510000002 GADOBENATE DIMEGLUMINE 529 MG/ML SOLUTION: Performed by: STUDENT IN AN ORGANIZED HEALTH CARE EDUCATION/TRAINING PROGRAM

## 2025-02-21 RX ADMIN — GADOBENATE DIMEGLUMINE 17 ML: 529 INJECTION, SOLUTION INTRAVENOUS at 09:49

## 2025-02-27 ENCOUNTER — OFFICE VISIT (OUTPATIENT)
Dept: UROLOGY | Facility: CLINIC | Age: 70
End: 2025-02-27
Payer: MEDICARE

## 2025-02-27 DIAGNOSIS — N40.1 BPH WITH OBSTRUCTION/LOWER URINARY TRACT SYMPTOMS: Primary | ICD-10-CM

## 2025-02-27 DIAGNOSIS — R97.20 ELEVATED PROSTATE SPECIFIC ANTIGEN (PSA): ICD-10-CM

## 2025-02-27 DIAGNOSIS — N41.1 CHRONIC PROSTATITIS: ICD-10-CM

## 2025-02-27 DIAGNOSIS — N13.8 BPH WITH OBSTRUCTION/LOWER URINARY TRACT SYMPTOMS: Primary | ICD-10-CM

## 2025-02-27 PROCEDURE — 1159F MED LIST DOCD IN RCRD: CPT | Performed by: STUDENT IN AN ORGANIZED HEALTH CARE EDUCATION/TRAINING PROGRAM

## 2025-02-27 PROCEDURE — 99214 OFFICE O/P EST MOD 30 MIN: CPT | Performed by: STUDENT IN AN ORGANIZED HEALTH CARE EDUCATION/TRAINING PROGRAM

## 2025-02-27 PROCEDURE — 1160F RVW MEDS BY RX/DR IN RCRD: CPT | Performed by: STUDENT IN AN ORGANIZED HEALTH CARE EDUCATION/TRAINING PROGRAM

## 2025-02-27 RX ORDER — TAMSULOSIN HYDROCHLORIDE 0.4 MG/1
1 CAPSULE ORAL DAILY
Qty: 90 CAPSULE | Refills: 3 | Status: SHIPPED | OUTPATIENT
Start: 2025-02-27

## 2025-02-27 RX ORDER — DOXYCYCLINE 100 MG/1
100 CAPSULE ORAL 2 TIMES DAILY
Qty: 40 CAPSULE | Refills: 0 | Status: SHIPPED | OUTPATIENT
Start: 2025-02-27 | End: 2025-03-19

## 2025-02-27 NOTE — PROGRESS NOTES
"     Follow Up Office Visit      Patient Name: Arturo Graham  : 1955   MRN: [id number]     Chief Complaint:    Chief Complaint   Patient presents with    Elevated PSA       Referring Provider: No ref. provider found    History of Present Illness: Arturo Graham is a 69 y.o. male who presents today for follow up of elevated PSA.  Previously saw urology in [place].  Outside records have been reviewed.  Patient's PSA trend:     Lab Results   Component Value Date    PSA 11.1 (H) 2025           PSA per outside records was 4.8 in 2019.    ExoDx urinary biomarker screening was performed at last visit.  This came back with \"very low risk of prostate cancer\".  Good news.  Returns today with MRI prostate.  MRI prostate demonstrates severely enlarged prostate roughly 100 g with diffuse heterogenous changes throughout the peripheral zone consistent with prostatitis.  He has a PI-RADS 3 lesion at the right base.  On my personal evaluation of his imaging this does not appear to be a discrete tumor.    Subjective      Review of System: Review of Systems   Genitourinary:  Negative for decreased urine volume, difficulty urinating, dysuria, enuresis, flank pain, frequency, hematuria and urgency.      I have reviewed the ROS documented by my clinical staff, I have updated appropriately and I agree. Gael Aleman MD    I have reviewed and the following portions of the patient's history were updated as appropriate: past family history, past medical history, past social history, past surgical history and problem list.    Medications:     Current Outpatient Medications:     atorvastatin (LIPITOR) 20 MG tablet, Take 1 tablet by mouth Daily., Disp: 90 tablet, Rfl: 1    doxycycline (VIBRAMYCIN) 100 MG capsule, Take 1 capsule by mouth 2 (Two) Times a Day for 20 days., Disp: 40 capsule, Rfl: 0    tamsulosin (FLOMAX) 0.4 MG capsule 24 hr capsule, Take 1 capsule by mouth Daily., Disp: 90 capsule, Rfl: 3    Allergies:   No " Known Allergies      Objective     Physical Exam:   Vital Signs: There were no vitals filed for this visit.  There is no height or weight on file to calculate BMI.     Physical Exam  Constitutional:       Appearance: Normal appearance.   HENT:      Head: Normocephalic and atraumatic.      Nose: Nose normal.   Eyes:      Extraocular Movements: Extraocular movements intact.      Conjunctiva/sclera: Conjunctivae normal.      Pupils: Pupils are equal, round, and reactive to light.   Musculoskeletal:         General: Normal range of motion.      Cervical back: Normal range of motion and neck supple.   Skin:     General: Skin is warm and dry.      Findings: No lesion or rash.   Neurological:      General: No focal deficit present.      Mental Status: He is alert and oriented to person, place, and time. Mental status is at baseline.   Psychiatric:         Mood and Affect: Mood normal.         Behavior: Behavior normal.         Labs:   Brief Urine Lab Results       None                 Lab Results   Component Value Date    GLUCOSE 111 (H) 01/21/2025    CALCIUM 10.6 (H) 01/21/2025     01/21/2025    K 4.8 01/21/2025    CO2 25 01/21/2025     01/21/2025    BUN 15 01/21/2025    CREATININE 0.94 01/21/2025    BCR 16 01/21/2025       Lab Results   Component Value Date    WBC 6.2 01/21/2025    HGB 16.7 01/21/2025    HCT 49.5 01/21/2025    MCV 93 01/21/2025     01/21/2025       Images:   MRI Prostate With & Without Contrast    Result Date: 2/24/2025  Impression: 1. Negative for PI-RADS 4 or 5 lesion. Asymmetric diffusion signal abnormality in the right peripheral zone of prostate base meeting criteria for a intermediate probability PI-RADS 3 lesion. Capsular abutment without extraprostatic extension. 2. No suspicious pelvic adenopathy or bone lesions. 3. Prostamegaly (99 cc gland) with BPH change. Appearance of the peripheral zone suggest sequelae of prior prostatitis (PI-RADS 2). PI-RADS 3 exam The prostate gland  was segmented and the suspicious lesion(s) were annotated on the [employer] system for UroNav guidance. Electronically Signed: Jeffrey Tesfaye MD  2/24/2025 3:58 PM EST  Workstation ID: [id number]      Measures:   Tobacco:   Arturo Graham  reports that he has never smoked. He has never used smokeless tobacco.       Assessment / Plan      Assessment/Plan:   69 y.o. male who presented today for follow up of extreme BPH, elevated PSA.  The most likely scenario is elevated PSA and chronic prostatitis driving the patient's PSA up.  He has a markedly enlarged prostate with severe lateral lobe and median lobe hyperplasia.  Despite his minimal urinary symptoms he is at risk of bladder health decline and for this reason I recommend tamsulosin.  We will treat him for empiric prostatitis with 1 month of twice daily doxycycline and repeat the PSA within 8 weeks.  If PSA continues rising we will consider a targeted transperineal biopsy in the operating room.  At the moment the patient simply has severe BPH with elevation of PSA.  ExoDx urinary testing is very reassuring indicating minimal risk of undiagnosed prostate cancer.  The risks of tamsulosin and chronic anabolic therapy were discussed at length with the patient.  He will notify me if any side effects.    Diagnoses and all orders for this visit:    1. BPH with obstruction/lower urinary tract symptoms (Primary)  -     tamsulosin (FLOMAX) 0.4 MG capsule 24 hr capsule; Take 1 capsule by mouth Daily.  Dispense: 90 capsule; Refill: 3    2. Chronic prostatitis  -     doxycycline (VIBRAMYCIN) 100 MG capsule; Take 1 capsule by mouth 2 (Two) Times a Day for 20 days.  Dispense: 40 capsule; Refill: 0    3. Elevated prostate specific antigen (PSA)  -     PSA Total+% Free (Serial); Future           Follow Up:   Return in about 8 weeks (around 4/24/2025) for 8 wks with PSA prior, Follow Up after Labs.    I spent approximately 20 minutes providing clinical care for this patient; including  review of patient's chart and provider documentation, face to face time spent with patient in examination room (obtaining history, performing physical exam, discussing diagnosis and management options), placing orders, and completing patient documentation.     Gael Aleman MD  [facility]

## 2025-04-03 ENCOUNTER — TELEPHONE (OUTPATIENT)
Dept: UROLOGY | Facility: CLINIC | Age: 70
End: 2025-04-03

## 2025-04-03 NOTE — TELEPHONE ENCOUNTER
Caller: Arturo Graham    Relationship: Self    Best call back number: [phone]    What form or medical record are you requesting: LAB ORDER PSA    Who is requesting this form or medical record from you: ELLIOTT MCCORMICK    How would you like to receive the form or medical records (pick-up, mail, fax): FAX  If fax, what is the fax number: [phone]      Timeframe paperwork needed: ASAP    Additional notes: THANK YOU

## 2025-04-08 ENCOUNTER — OFFICE VISIT (OUTPATIENT)
Dept: FAMILY MEDICINE CLINIC | Facility: CLINIC | Age: 70
End: 2025-04-08
Payer: MEDICARE

## 2025-04-08 VITALS
BODY MASS INDEX: 27.55 KG/M2 | SYSTOLIC BLOOD PRESSURE: 144 MMHG | WEIGHT: 186 LBS | OXYGEN SATURATION: 98 % | TEMPERATURE: 98.3 F | HEART RATE: 73 BPM | DIASTOLIC BLOOD PRESSURE: 84 MMHG | HEIGHT: 69 IN

## 2025-04-08 DIAGNOSIS — R97.20 ELEVATED PROSTATE SPECIFIC ANTIGEN (PSA): ICD-10-CM

## 2025-04-08 NOTE — PROGRESS NOTES
Office Note     Name: Arturo Graham    : 1955     MRN: [id number]     Chief Complaint  follow up from urologist  (Pt is here for a follow up from urologist today. States that he is needing a PSA. )    Subjective     History of Present Illness:  Arturo Graham is a 69 y.o. male who presents today for:    History of Present Illness  The patient is a 69-year-old male who presents today to follow up on a prostate issue. He was seen in 2021 for an annual physical, during which he reported experiencing nocturia. At that time, a PSA test was conducted, and he was referred to urology due to his history of treatment-resistant BPH. He saw Dr. Gael Snyder in 2021, who performed an ExoDx urine screening and an MRI of the prostate. The MRI results indicated a severely enlarged prostate, but no discrete tumor was identified. The urologist concluded that the symptoms and findings were more indicative of severe BPH and chronic prostatitis. Consequently, it was recommended to restart tamsulosin and to treat empirically for prostatitis with twice-daily doxycycline for a month. A repeat PSA test was planned for 4 weeks after completing the medication. If the PSA levels continued to rise, a possible transperineal biopsy was considered.    He is scheduled for a blood work today and has provided a urine sample to ensure the absence of antibiotics. He completed his antibiotic course approximately 2 weeks ago, which he started immediately after his last appointment at the end of 2025. The regimen included taking 2 antibiotics daily for 20 days. He reports an improvement in his condition following the initiation of the antibiotic therapy. He has a follow-up appointment with his urologist on 2025.    He has not yet started his cholesterol medication. Initially, the prescription was not filled, and when it was eventually filled, it was at a pharmacy located 60 miles away. He has since made  dietary modifications, including reducing his intake of saturated fats, and plans to start the medication if his cholesterol levels remain high at the next check. He has also incorporated steel-cut oats into his diet as a replacement for cheese, while continuing to consume eggs. He has switched from ranch dressing to olive oil and vinegar. He has lost weight as a result of these dietary changes.    MEDICATIONS  Current: Tamsulosin, doxycycline      Review of Systems:   Review of Systems    Past Medical History:   Past Medical History:   Diagnosis Date    Elevated PSA     HL (hearing loss)     Sometimes have to have something repeated if there is background noise    Kidney stone     Had a kidney stone removed in July 2018       Past Surgical History:   Past Surgical History:   Procedure Laterality Date    COLONOSCOPY      2020    EXTRACORPOREAL SHOCK WAVE LITHOTRIPSY (ESWL) Left 07/06/2018    Procedure: LT EXTRACORPOREAL SHOCKWAVE LITHOTRIPSY WITH CYSTOSCOPY MANIPULATION URETERAL STONE;  Surgeon: Jaime Ken MD;  Location: [facility];  Service: Urology    KIDNEY STONE SURGERY  2018       Family History:   Family History   Problem Relation Age of Onset    Skin cancer Mother     Diabetes Mother     Heart attack Father     Heart disease Father     Hyperlipidemia Father        Social History:   Social History     Socioeconomic History    Marital status:    Tobacco Use    Smoking status: Never    Smokeless tobacco: Never   Vaping Use    Vaping status: Never Used   Substance and Sexual Activity    Alcohol use: Yes     Alcohol/week: 5.0 standard drinks of alcohol     Types: 5 Glasses of wine per week     Comment: OCCASIONALLY    Drug use: No    Sexual activity: Not Currently     Partners: Female       Immunizations:   Immunization History   Administered Date(s) Administered    COVID-19 (DOMINIC) 05/15/2021, 11/20/2021    Pneumococcal Conjugate 13-Valent (PCV13) 01/20/2016        Medications:     Current  "Outpatient Medications:     tamsulosin (FLOMAX) 0.4 MG capsule 24 hr capsule, Take 1 capsule by mouth Daily., Disp: 90 capsule, Rfl: 3    atorvastatin (LIPITOR) 20 MG tablet, Take 1 tablet by mouth Daily. (Patient not taking: Reported on 4/8/2025), Disp: 90 tablet, Rfl: 1    Allergies:   No Known Allergies    Objective     Vital Signs  /84   Pulse 73   Temp 98.3 °F (36.8 °C) (Oral)   Ht 175.3 cm (69\")   Wt 84.4 kg (186 lb)   SpO2 98%   BMI 27.47 kg/m²   Estimated body mass index is 27.47 kg/m² as calculated from the following:    Height as of this encounter: 175.3 cm (69\").    Weight as of this encounter: 84.4 kg (186 lb).    Vital Signs were reviewed.            Physical Exam  Vitals and nursing note reviewed.   Constitutional:       Appearance: Normal appearance.   Cardiovascular:      Rate and Rhythm: Normal rate and regular rhythm.      Heart sounds: No murmur heard.     No friction rub. No gallop.   Pulmonary:      Effort: Pulmonary effort is normal.      Breath sounds: Normal breath sounds. No wheezing, rhonchi or rales.   Neurological:      Mental Status: He is alert.        Physical Exam         Results  Laboratory Studies  PSA was slightly elevated at 11.1. ExoDx urinary biomarker showed a very low risk of prostate cancer. LDL cholesterol was 192.    Imaging  Prostate MRI showed asymmetric diffuse signal abnormality meeting criteria for PI-RADS 3 lesion megaly and a severely enlarged prostate.    Procedures     Assessment and Plan     Diagnoses:    ICD-10-CM ICD-9-CM   1. Elevated prostate specific antigen (PSA)  R97.20 790.93       Plan:    1. Elevated prostate specific antigen (PSA)    - PSA Total+% Free (Serial)       Assessment & Plan  1. Prostate issue.  He was previously treated with Flomax, which proved ineffective. His PSA level was slightly elevated at 11.1, leading to a referral to urology. The urologist reviewed the MRI and concluded that the symptoms and findings were more likely due " to severe BPH and chronic prostatitis. He was advised to restart tamsulosin and was treated empirically for prostatitis with doxycycline twice daily for a month. A repeat PSA test was planned for 4 weeks after completing the medication. If the PSA level continues to rise, a transperineal biopsy may be considered. A repeat PSA test will be conducted today. If the results are not satisfactory, another PSA test will be scheduled in a month.    2. Elevated cholesterol.  His LDL cholesterol level is significantly elevated at 192, which is approximately double the desired level. He has been advised to continue with dietary modifications and recheck his cholesterol levels in July 2025.         Follow Up  No follow-ups on file.    Patient was advised to call the office or seek medical care if  any issues discussed during this visit worsen or persist or if new concerns arise    Patient or patient representative verbalized consent for the use of Ambient Listening during the visit with  Olivia Alston MD for chart documentation. 4/22/2025  12:05 EDT    Olivia Alston MD  [facility]  [address] [place] [id number] [phone]

## 2025-04-09 LAB
PSA FREE MFR SERPL: 21.7 %
PSA FREE SERPL-MCNC: 2.13 NG/ML
PSA SERPL-MCNC: 9.8 NG/ML (ref 0–4)

## 2025-04-24 ENCOUNTER — OFFICE VISIT (OUTPATIENT)
Dept: UROLOGY | Facility: CLINIC | Age: 70
End: 2025-04-24
Payer: MEDICARE

## 2025-04-24 DIAGNOSIS — N13.8 BPH WITH OBSTRUCTION/LOWER URINARY TRACT SYMPTOMS: ICD-10-CM

## 2025-04-24 DIAGNOSIS — R97.20 ELEVATED PROSTATE SPECIFIC ANTIGEN (PSA): Primary | ICD-10-CM

## 2025-04-24 DIAGNOSIS — N40.1 BPH WITH OBSTRUCTION/LOWER URINARY TRACT SYMPTOMS: ICD-10-CM

## 2025-04-24 PROCEDURE — 1160F RVW MEDS BY RX/DR IN RCRD: CPT | Performed by: STUDENT IN AN ORGANIZED HEALTH CARE EDUCATION/TRAINING PROGRAM

## 2025-04-24 PROCEDURE — 99214 OFFICE O/P EST MOD 30 MIN: CPT | Performed by: STUDENT IN AN ORGANIZED HEALTH CARE EDUCATION/TRAINING PROGRAM

## 2025-04-24 PROCEDURE — 1159F MED LIST DOCD IN RCRD: CPT | Performed by: STUDENT IN AN ORGANIZED HEALTH CARE EDUCATION/TRAINING PROGRAM

## 2025-04-24 NOTE — PROGRESS NOTES
"     Follow Up Office Visit      Patient Name: Arturo Graham  : 1955   MRN: [id number]     Chief Complaint:    Chief Complaint   Patient presents with    BPH with obstruction/lower urinary tract symptoms       Referring Provider: No ref. provider found    History of Present Illness: Arturo Graham is a 69 y.o. male who presents today for follow up of elevated PSA.  Patient established care with me on .  Patient's PSA at that time was 11.1.  Previous urologist was in [place].  We ordered ExoDx urine testing and MRI prostate.  ExoDx testing came back very low risk.  MRI prostate suggested extreme BPH with 100 g, no obvious high risk lesions however he has an indeterminant PI-RADS 3 lesion at the right base.  Here today for repeat PSA testing after treatment for presumed prostatitis with a 10-day course of doxycycline twice daily.  As a result of taking doxycycline the patient's urinary symptoms did improve, he states he \"felt better\" on the antibiotic.  He continues taking tamsulosin.    His recent PSA is 9.8 down from 11.1.  Free PSA percentage is robust at 21%, this indicates a less than 20% chance of undiagnosed prostate cancer.    Subjective      Review of System: Review of Systems   Genitourinary: Negative.  Negative for decreased urine volume, difficulty urinating, dysuria, enuresis, flank pain, frequency, hematuria and urgency.      I have reviewed the ROS documented by my clinical staff, I have updated appropriately and I agree. Gael Aleman MD    I have reviewed and the following portions of the patient's history were updated as appropriate: past family history, past medical history, past social history, past surgical history and problem list.    Medications:     Current Outpatient Medications:     tamsulosin (FLOMAX) 0.4 MG capsule 24 hr capsule, Take 1 capsule by mouth Daily., Disp: 90 capsule, Rfl: 3    atorvastatin (LIPITOR) 20 MG tablet, Take 1 tablet by mouth Daily. (Patient " not taking: Reported on 4/24/2025), Disp: 90 tablet, Rfl: 1    Allergies:   No Known Allergies    IPSS Questionnaire (AUA-7):  Over the past month…    1)  Incomplete Emptying:       How often have you had a sensation of not emptying you had the sensation of not emptying your bladder completely after you finished urinating?  1 - Less than 1 time in 5   2)  Frequency:       How often have you had the urinate again less than two hours after you finished urinating?  2 - Less than half the time   3)  Intermittency:       How often have you found you stopped and started again several times when you urinated?   1 - Less than 1 time in 5   4) Urgency:      How often have you found it difficult to postpone urination?  1 - Less than 1 time in 5   5) Weak Stream:      How often have you had a weak urinary stream?  1 - Less than 1 time in 5   6) Straining:       How often have you had to push or strain to begin urination?  1 - Less than 1 time in 5   7) Nocturia:      How many times did you most typically get up to urinate from the time you went to bed at night until the time you got up in the morning?  2 - 2 times   Total Score:  9   The International Prostate Symptom Score (IPSS) is used to screen, diagnose, track symptoms of benign prostatic hyperplasia (BPH).   0-7 (Mild Symptoms) 8-19 (Moderate) 20-35 (Severe)   Quality of Life (QoL):  If you were to spend the rest of your life with your urinary condition just the way it is now, how would you feel about that? 2-Mostly Satisfied   Urine Leakage (Incontinence) 0-No Leakage         Objective     Physical Exam:   Vital Signs: There were no vitals filed for this visit.  There is no height or weight on file to calculate BMI.     Physical Exam  Constitutional:       Appearance: Normal appearance.   HENT:      Head: Normocephalic and atraumatic.      Nose: Nose normal.   Eyes:      Extraocular Movements: Extraocular movements intact.      Conjunctiva/sclera: Conjunctivae normal.       Pupils: Pupils are equal, round, and reactive to light.   Musculoskeletal:         General: Normal range of motion.      Cervical back: Normal range of motion and neck supple.   Skin:     General: Skin is warm and dry.      Findings: No lesion or rash.   Neurological:      General: No focal deficit present.      Mental Status: He is alert and oriented to person, place, and time. Mental status is at baseline.   Psychiatric:         Mood and Affect: Mood normal.         Behavior: Behavior normal.         Labs:   Brief Urine Lab Results       None                 Lab Results   Component Value Date    GLUCOSE 111 (H) 01/21/2025    CALCIUM 10.6 (H) 01/21/2025     01/21/2025    K 4.8 01/21/2025    CO2 25 01/21/2025     01/21/2025    BUN 15 01/21/2025    CREATININE 0.94 01/21/2025    BCR 16 01/21/2025       Lab Results   Component Value Date    WBC 6.2 01/21/2025    HGB 16.7 01/21/2025    HCT 49.5 01/21/2025    MCV 93 01/21/2025     01/21/2025       Images:   MRI Prostate With & Without Contrast  Result Date: 2/24/2025  Impression: 1. Negative for PI-RADS 4 or 5 lesion. Asymmetric diffusion signal abnormality in the right peripheral zone of prostate base meeting criteria for a intermediate probability PI-RADS 3 lesion. Capsular abutment without extraprostatic extension. 2. No suspicious pelvic adenopathy or bone lesions. 3. Prostamegaly (99 cc gland) with BPH change. Appearance of the peripheral zone suggest sequelae of prior prostatitis (PI-RADS 2). PI-RADS 3 exam The prostate gland was segmented and the suspicious lesion(s) were annotated on the [employer] system for UroNav guidance. Electronically Signed: Jeffrey Tesfaye MD  2/24/2025 3:58 PM EST  Workstation ID: [id number]      Measures:   Tobacco:   Artuor Graham  reports that he has never smoked. He has never used smokeless tobacco.    Assessment / Plan      Assessment/Plan:   69 y.o. male who presented today for follow up of elevated PSA.  PSA  has come down somewhat with antibiotic.  I believe most of his elevation of his PSA could be explained by his extreme BPH.  At this time he denies significant urinary concerns and defers further evaluation for BPH.  He will continue tamsulosin for BPH.  I discussed options for his elevated PSA.  This would include an upfront targeted biopsy or the addition of finasteride due to his extreme BPH to promote prostate shrinkage.  Discussed the risks benefits and alternatives of finasteride.  The patient would like to defer finasteride therapy at this time and see if his PSA declines naturally over time in 6 months with a repeat PSA.  This is reasonable.  We discussed if his PSA continues rising we would suggest a targeted biopsy given his PI-RADS 3 lesion.    Diagnoses and all orders for this visit:    1. Elevated prostate specific antigen (PSA) (Primary)  -     PSA Total+% Free (Serial); Future    2. BPH with obstruction/lower urinary tract symptoms  - continue Tamsulosin         Follow Up:   Return in about 6 months (around 10/24/2025) for Follow Up after Labs.    I spent approximately 30 minutes providing clinical care for this patient; including review of patient's chart and provider documentation, face to face time spent with patient in examination room (obtaining history, performing physical exam, discussing diagnosis and management options), placing orders, and completing patient documentation.     Gael Aleman MD  [facility] [place]

## 2025-07-08 ENCOUNTER — LAB (OUTPATIENT)
Dept: FAMILY MEDICINE CLINIC | Facility: CLINIC | Age: 70
End: 2025-07-08
Payer: MEDICARE

## 2025-07-08 DIAGNOSIS — R97.20 ELEVATED PROSTATE SPECIFIC ANTIGEN (PSA): ICD-10-CM

## 2025-07-09 LAB
PSA FREE MFR SERPL: 18.7 %
PSA FREE SERPL-MCNC: 1.89 NG/ML
PSA SERPL-MCNC: 10.1 NG/ML (ref 0–4)

## (undated) DEVICE — LOU CYSTO: Brand: MEDLINE INDUSTRIES, INC.

## (undated) DEVICE — CATH URETRL FLXITP POLLACK STD 5F 70CM

## (undated) DEVICE — GLV SURG TRIUMPH CLASSIC PF LTX 8 STRL

## (undated) DEVICE — NITINOL WIRE WITH HYDROPHILIC TIP: Brand: SENSOR